# Patient Record
Sex: FEMALE | Race: BLACK OR AFRICAN AMERICAN | NOT HISPANIC OR LATINO | Employment: FULL TIME | URBAN - METROPOLITAN AREA
[De-identification: names, ages, dates, MRNs, and addresses within clinical notes are randomized per-mention and may not be internally consistent; named-entity substitution may affect disease eponyms.]

---

## 2023-04-12 ENCOUNTER — HOSPITAL ENCOUNTER (OUTPATIENT)
Dept: DATA CONVERSION | Facility: HOSPITAL | Age: 43
Discharge: HOME | End: 2023-04-13
Attending: INTERNAL MEDICINE | Admitting: INTERNAL MEDICINE

## 2023-04-12 LAB
ALBUMIN SERPL-MCNC: 4.8 GM/DL (ref 3.5–5)
ALBUMIN/GLOB SERPL: 1.7 RATIO (ref 1.5–3)
ALP BLD-CCNC: 85 U/L (ref 35–125)
ALT SERPL-CCNC: 16 U/L (ref 5–40)
AMPHETAMINES UR QL SCN>1000 NG/ML: NEGATIVE
ANION GAP SERPL CALCULATED.3IONS-SCNC: 21 MMOL/L (ref 0–19)
APAP SERPL-MCNC: 5 UG/ML (ref 15–30)
AST SERPL-CCNC: 31 U/L (ref 5–40)
BACTERIA UR QL AUTO: NEGATIVE
BARBITURATES UR QL SCN>300 NG/ML: NEGATIVE
BASOPHILS # BLD AUTO: 0.03 K/UL (ref 0–0.22)
BASOPHILS NFR BLD AUTO: 0.1 % (ref 0–1)
BENZODIAZ UR QL SCN>300 NG/ML: NEGATIVE
BILIRUB SERPL-MCNC: 0.9 MG/DL (ref 0.1–1.2)
BILIRUB UR QL STRIP.AUTO: NEGATIVE
BUN SERPL-MCNC: 12 MG/DL (ref 8–25)
BUN/CREAT SERPL: 17.1 RATIO (ref 8–21)
BZE UR QL SCN>300 NG/ML: NEGATIVE
CALCIUM SERPL-MCNC: 9.5 MG/DL (ref 8.5–10.4)
CANNABINOIDS UR QL SCN>50 NG/ML: NORMAL
CHLORIDE SERPL-SCNC: 99 MMOL/L (ref 97–107)
CLARITY UR: CLEAR
CO2 SERPL-SCNC: 16 MMOL/L (ref 24–31)
COLOR UR: ABNORMAL
CREAT SERPL-MCNC: 0.7 MG/DL (ref 0.4–1.6)
DEPRECATED RDW RBC AUTO: 41.8 FL (ref 37–54)
DIFFERENTIAL METHOD BLD: ABNORMAL
DRUG SCREEN COMMENT UR-IMP: NORMAL
EOSINOPHIL # BLD AUTO: 0 K/UL (ref 0–0.45)
EOSINOPHIL NFR BLD: 0 % (ref 0–3)
EPITH CASTS #/AREA UR COMP ASSIST: ABNORMAL /HPF
ERYTHROCYTE [DISTWIDTH] IN BLOOD BY AUTOMATED COUNT: 13.4 % (ref 11.7–15)
ETHANOL SERPL-MCNC: <0.01 GM/DL (ref 0–0.01)
EUA DISCLAIMER: NORMAL
FENTANYL+NORFENTANYL UR QL SCN: NORMAL
FLUAV RNA NPH QL NAA+PROBE: NEGATIVE
FLUBV RNA NPH QL NAA+PROBE: NEGATIVE
GFR SERPL CREATININE-BSD FRML MDRD: 111 ML/MIN/1.73 M2
GLOBULIN SER-MCNC: 2.8 G/DL (ref 1.9–3.7)
GLUCOSE SERPL-MCNC: 163 MG/DL (ref 65–99)
GLUCOSE UR STRIP.AUTO-MCNC: 300 MG/DL
HCG SERPL-ACNC: 1 MIU/ML
HCT VFR BLD AUTO: 36.4 % (ref 36–44)
HGB BLD-MCNC: 12.7 GM/DL (ref 12–15)
HGB UR QL STRIP.AUTO: 1 /HPF (ref 0–3)
HGB UR QL: NEGATIVE
HS TROPONIN T DELTA: 0 (ref 0–4)
HS TROPONIN T DELTA: NORMAL (ref 0–4)
HYALINE CASTS UR QL AUTO: 3 /LPF
IMM GRANULOCYTES # BLD AUTO: 0.12 K/UL (ref 0–0.1)
KETONES UR QL STRIP.AUTO: ABNORMAL
LEUKOCYTE ESTERASE UR QL STRIP.AUTO: NEGATIVE
LIPASE SERPL-CCNC: 16 U/L (ref 16–63)
LYMPHOCYTES # BLD AUTO: 0.73 K/UL (ref 1.2–3.2)
LYMPHOCYTES NFR BLD MANUAL: 3.3 % (ref 20–40)
MAGNESIUM SERPL-MCNC: 1.6 MG/DL (ref 1.6–3.1)
MCH RBC QN AUTO: 30 PG (ref 26–34)
MCHC RBC AUTO-ENTMCNC: 34.9 % (ref 31–37)
MCV RBC AUTO: 86.1 FL (ref 80–100)
METHADONE UR QL SCN>300 NG/ML: NEGATIVE
MONOCYTES # BLD AUTO: 1.08 K/UL (ref 0–0.8)
MONOCYTES NFR BLD MANUAL: 4.9 % (ref 0–8)
NEUTROPHILS # BLD AUTO: 20.03 K/UL
NEUTROPHILS # BLD AUTO: 20.03 K/UL (ref 1.8–7.7)
NEUTROPHILS.IMMATURE NFR BLD: 0.5 % (ref 0–1)
NEUTS SEG NFR BLD: 91.2 % (ref 50–70)
NITRITE UR QL STRIP.AUTO: NEGATIVE
NRBC BLD-RTO: 0 /100 WBC
OPIATES UR QL SCN>300 NG/ML: NEGATIVE
OXYCODONE UR QL: NEGATIVE
PCP UR QL SCN>25 NG/ML: NEGATIVE
PH UR STRIP.AUTO: 8 [PH] (ref 4.6–8)
PLATELET # BLD AUTO: 308 K/UL (ref 150–450)
PLATELET BLD QL SMEAR: ABNORMAL
PMV BLD AUTO: 11.1 CU (ref 7–12.6)
POTASSIUM SERPL-SCNC: 2.8 MMOL/L (ref 3.4–5.1)
PROT SERPL-MCNC: 7.6 G/DL (ref 5.9–7.9)
PROT UR STRIP.AUTO-MCNC: ABNORMAL MG/DL
RBC # BLD AUTO: 4.23 M/UL (ref 4–4.9)
RBC MORPH BLD: ABNORMAL
REFLEX MICROSCOPIC (UA): ABNORMAL
SALICYLATES SERPL-MCNC: 0.3 MG/DL (ref 3–25)
SARS-COV-2 RNA SPEC QL NAA+PROBE: NEGATIVE
SODIUM SERPL-SCNC: 136 MMOL/L (ref 133–145)
SP GR UR STRIP.AUTO: 1.02 (ref 1–1.03)
SQUAMOUS UR QL AUTO: ABNORMAL /HPF
TROPONIN T SERPL-MCNC: 6 NG/L
TROPONIN T SERPL-MCNC: 6 NG/L
UROBILINOGEN UR QL STRIP.AUTO: NORMAL MG/DL (ref 0–1)
WBC # BLD AUTO: 22 K/UL (ref 4.5–11)
WBC #/AREA URNS AUTO: 2 /HPF (ref 0–3)
WBC MORPH BLD: ABNORMAL

## 2023-04-13 LAB
ALBUMIN 45M PRE CHAL SERPL-MCNC: 23 MG/DL (ref 18–45)
ANION GAP SERPL CALCULATED.3IONS-SCNC: 13 MMOL/L (ref 0–19)
B-OH-BUTYR+ACETOACET BLD-SCNC: 1.2 MMOL/L (ref 0.1–0.3)
BUN SERPL-MCNC: 10 MG/DL (ref 8–25)
BUN/CREAT SERPL: 20 RATIO (ref 8–21)
CALCIUM SERPL-MCNC: 6.6 MG/DL (ref 8.5–10.4)
CHLORIDE SERPL-SCNC: 115 MMOL/L (ref 97–107)
CO2 SERPL-SCNC: 12 MMOL/L (ref 24–31)
CREAT SERPL-MCNC: 0.5 MG/DL (ref 0.4–1.6)
GFR SERPL CREATININE-BSD FRML MDRD: 121 ML/MIN/1.73 M2
GLUCOSE SERPL-MCNC: 95 MG/DL (ref 65–99)
POTASSIUM SERPL-SCNC: 2.5 MMOL/L (ref 3.4–5.1)
SODIUM SERPL-SCNC: 140 MMOL/L (ref 133–145)

## 2023-04-14 DIAGNOSIS — N83.209 CYST OF OVARY: ICD-10-CM

## 2023-04-14 DIAGNOSIS — D72.829 ELEVATED WHITE BLOOD CELL COUNT, UNSPECIFIED: ICD-10-CM

## 2023-04-14 DIAGNOSIS — F19.10 OTHER PSYCHOACTIVE SUBSTANCE ABUSE, UNCOMPLICATED (MULTI): ICD-10-CM

## 2023-04-14 DIAGNOSIS — E87.20 ACIDOSIS, UNSPECIFIED: ICD-10-CM

## 2023-04-14 DIAGNOSIS — R10.9 UNSPECIFIED ABDOMINAL PAIN: ICD-10-CM

## 2023-04-14 DIAGNOSIS — Z20.822 CONTACT WITH AND (SUSPECTED) EXPOSURE TO COVID-19: ICD-10-CM

## 2023-04-14 DIAGNOSIS — R07.9 CHEST PAIN, UNSPECIFIED: ICD-10-CM

## 2023-04-14 DIAGNOSIS — N83.2 CYST OF OVARY: ICD-10-CM

## 2023-04-14 DIAGNOSIS — E86.0 DEHYDRATION: ICD-10-CM

## 2023-04-14 DIAGNOSIS — E87.6 HYPOKALEMIA: ICD-10-CM

## 2023-04-14 DIAGNOSIS — K76.89 OTHER SPECIFIED DISEASES OF LIVER: ICD-10-CM

## 2023-04-14 DIAGNOSIS — F10.231 ALCOHOL DEPENDENCE WITH WITHDRAWAL DELIRIUM (MULTI): Primary | ICD-10-CM

## 2023-04-14 DIAGNOSIS — E83.42 HYPOMAGNESEMIA: ICD-10-CM

## 2023-04-14 DIAGNOSIS — F17.210 NICOTINE DEPENDENCE, CIGARETTES, UNCOMPLICATED: ICD-10-CM

## 2023-04-14 DIAGNOSIS — R11.2 NAUSEA WITH VOMITING, UNSPECIFIED: ICD-10-CM

## 2023-04-14 LAB
ALBUMIN SERPL-MCNC: 4 GM/DL (ref 3.5–5)
ALBUMIN/GLOB SERPL: 1.6 RATIO (ref 1.5–3)
ALP BLD-CCNC: 74 U/L (ref 35–125)
ALT SERPL-CCNC: 27 U/L (ref 5–40)
AMYLASE SERPL-CCNC: 90 U/L (ref 28–100)
ANION GAP SERPL CALCULATED.3IONS-SCNC: 10 MMOL/L (ref 0–19)
AST SERPL-CCNC: 38 U/L (ref 5–40)
BASOPHILS # BLD AUTO: 0.02 K/UL (ref 0–0.22)
BASOPHILS NFR BLD AUTO: 0.2 % (ref 0–1)
BILIRUB SERPL-MCNC: 0.4 MG/DL (ref 0.1–1.2)
BUN SERPL-MCNC: 7 MG/DL (ref 8–25)
BUN/CREAT SERPL: 8.8 RATIO (ref 8–21)
CALCIUM SERPL-MCNC: 8.4 MG/DL (ref 8.5–10.4)
CHLORIDE SERPL-SCNC: 104 MMOL/L (ref 97–107)
CO2 SERPL-SCNC: 25 MMOL/L (ref 24–31)
CREAT SERPL-MCNC: 0.8 MG/DL (ref 0.4–1.6)
DEPRECATED RDW RBC AUTO: 43.3 FL (ref 37–54)
DIFFERENTIAL METHOD BLD: ABNORMAL
EOSINOPHIL # BLD AUTO: 0.02 K/UL (ref 0–0.45)
EOSINOPHIL NFR BLD: 0.2 % (ref 0–3)
ERYTHROCYTE [DISTWIDTH] IN BLOOD BY AUTOMATED COUNT: 13.7 % (ref 11.7–15)
GFR SERPL CREATININE-BSD FRML MDRD: 95 ML/MIN/1.73 M2
GLOBULIN SER-MCNC: 2.5 G/DL (ref 1.9–3.7)
GLUCOSE SERPL-MCNC: 90 MG/DL (ref 65–99)
HCT VFR BLD AUTO: 35.8 % (ref 36–44)
HGB BLD-MCNC: 12.1 GM/DL (ref 12–15)
IMM GRANULOCYTES # BLD AUTO: 0.04 K/UL (ref 0–0.1)
LIPASE SERPL-CCNC: 17 U/L (ref 16–63)
LYMPHOCYTES # BLD AUTO: 2.34 K/UL (ref 1.2–3.2)
LYMPHOCYTES NFR BLD MANUAL: 22.3 % (ref 20–40)
MAGNESIUM SERPL-MCNC: 2.5 MG/DL (ref 1.6–3.1)
MCH RBC QN AUTO: 29.3 PG (ref 26–34)
MCHC RBC AUTO-ENTMCNC: 33.8 % (ref 31–37)
MCV RBC AUTO: 86.7 FL (ref 80–100)
MONOCYTES # BLD AUTO: 1.19 K/UL (ref 0–0.8)
MONOCYTES NFR BLD MANUAL: 11.3 % (ref 0–8)
NEUTROPHILS # BLD AUTO: 6.89 K/UL
NEUTROPHILS # BLD AUTO: 6.89 K/UL (ref 1.8–7.7)
NEUTROPHILS.IMMATURE NFR BLD: 0.4 % (ref 0–1)
NEUTS SEG NFR BLD: 65.6 % (ref 50–70)
NRBC BLD-RTO: 0 /100 WBC
PHOSPHATE SERPL-MCNC: 1.4 MG/DL (ref 2.5–4.5)
PLATELET # BLD AUTO: 300 K/UL (ref 150–450)
PMV BLD AUTO: 10.8 CU (ref 7–12.6)
POTASSIUM SERPL-SCNC: 3 MMOL/L (ref 3.4–5.1)
PROT SERPL-MCNC: 6.5 G/DL (ref 5.9–7.9)
RBC # BLD AUTO: 4.13 M/UL (ref 4–4.9)
SODIUM SERPL-SCNC: 139 MMOL/L (ref 133–145)
WBC # BLD AUTO: 10.5 K/UL (ref 4.5–11)

## 2023-07-25 ENCOUNTER — HOSPITAL ENCOUNTER (OUTPATIENT)
Dept: DATA CONVERSION | Facility: HOSPITAL | Age: 43
Discharge: HOME | End: 2023-07-25
Attending: STUDENT IN AN ORGANIZED HEALTH CARE EDUCATION/TRAINING PROGRAM

## 2023-07-25 DIAGNOSIS — R11.2 NAUSEA WITH VOMITING, UNSPECIFIED: ICD-10-CM

## 2023-07-25 DIAGNOSIS — F17.200 NICOTINE DEPENDENCE, UNSPECIFIED, UNCOMPLICATED: ICD-10-CM

## 2023-07-25 DIAGNOSIS — R10.30 LOWER ABDOMINAL PAIN, UNSPECIFIED: Primary | ICD-10-CM

## 2023-07-25 LAB
ACANTHOCYTES BLD QL SMEAR: ABNORMAL
ALBUMIN SERPL-MCNC: 5.2 GM/DL (ref 3.5–5)
ALBUMIN/GLOB SERPL: 1.7 RATIO (ref 1.5–3)
ALP BLD-CCNC: 93 U/L (ref 35–125)
ALT SERPL-CCNC: 18 U/L (ref 5–40)
AMPHETAMINES UR QL SCN>1000 NG/ML: NORMAL
ANION GAP SERPL CALCULATED.3IONS-SCNC: 25 MMOL/L (ref 0–19)
ANISOCYTOSIS BLD QL SMEAR: ABNORMAL
AST SERPL-CCNC: 31 U/L (ref 5–40)
BACTERIA UR QL AUTO: NEGATIVE
BARBITURATES UR QL SCN>300 NG/ML: NEGATIVE
BENZODIAZ UR QL SCN>300 NG/ML: NEGATIVE
BILIRUB SERPL-MCNC: 1.2 MG/DL (ref 0.1–1.2)
BILIRUB UR QL STRIP.AUTO: NEGATIVE
BUN SERPL-MCNC: 11 MG/DL (ref 8–25)
BUN/CREAT SERPL: 13.8 RATIO (ref 8–21)
BZE UR QL SCN>300 NG/ML: NEGATIVE
CALCIUM SERPL-MCNC: 10.4 MG/DL (ref 8.5–10.4)
CANNABINOIDS UR QL SCN>50 NG/ML: NORMAL
CHLORIDE SERPL-SCNC: 95 MMOL/L (ref 97–107)
CLARITY UR: CLEAR
CO2 SERPL-SCNC: 17 MMOL/L (ref 24–31)
COLOR UR: ABNORMAL
CREAT SERPL-MCNC: 0.8 MG/DL (ref 0.4–1.6)
DACRYOCYTES BLD QL SMEAR: ABNORMAL
DEPRECATED RDW RBC AUTO: 44.5 FL (ref 37–54)
DIFFERENTIAL METHOD BLD: MANUAL DIFF
DRUG SCREEN COMMENT UR-IMP: NORMAL
ERYTHROCYTE [DISTWIDTH] IN BLOOD BY AUTOMATED COUNT: 14.3 % (ref 11.7–15)
ETHANOL SERPL-MCNC: <0.01 GM/DL (ref 0–0.01)
FENTANYL+NORFENTANYL UR QL SCN: NORMAL
GFR SERPL CREATININE-BSD FRML MDRD: 95 ML/MIN/1.73 M2
GLOBULIN SER-MCNC: 3 G/DL (ref 1.9–3.7)
GLUCOSE SERPL-MCNC: 195 MG/DL (ref 65–99)
GLUCOSE UR STRIP.AUTO-MCNC: 30 MG/DL
HCG UR QL: NEGATIVE
HCT VFR BLD AUTO: 38.1 % (ref 36–44)
HGB BLD-MCNC: 12.7 GM/DL (ref 12–15)
HGB UR QL STRIP.AUTO: 118 /HPF (ref 0–3)
HGB UR QL: ABNORMAL
HYALINE CASTS UR QL AUTO: 6 /LPF
KETONES UR QL STRIP.AUTO: ABNORMAL
LEUKOCYTE ESTERASE UR QL STRIP.AUTO: NEGATIVE
LIPASE SERPL-CCNC: 16 U/L (ref 16–63)
LYMPHOCYTES # BLD AUTO: 4.09 K/UL (ref 1.2–3.2)
LYMPHOCYTES NFR BLD MANUAL: 17 % (ref 20–40)
MCH RBC QN AUTO: 28.3 PG (ref 26–34)
MCHC RBC AUTO-ENTMCNC: 33.3 % (ref 31–37)
MCV RBC AUTO: 84.9 FL (ref 80–100)
METHADONE UR QL SCN>300 NG/ML: NEGATIVE
MICROSCOPIC (UA): ABNORMAL
MONOCYTES # BLD AUTO: 0.24 K/UL (ref 0–0.8)
MONOCYTES NFR BLD MANUAL: 1 % (ref 0–8)
NEUTROPHILS # BLD AUTO: 19.7 K/UL (ref 1.8–7.7)
NEUTROPHILS # BLD AUTO: ABNORMAL K/UL
NEUTS SEG NFR BLD: 82 % (ref 50–70)
NITRITE UR QL STRIP.AUTO: NEGATIVE
OPIATES UR QL SCN>300 NG/ML: NEGATIVE
OVALOCYTES BLD QL SMEAR: ABNORMAL
OXYCODONE UR QL: NEGATIVE
PCP UR QL SCN>25 NG/ML: NEGATIVE
PH UR STRIP.AUTO: 5.5 [PH] (ref 4.6–8)
PLATELET # BLD AUTO: 303 K/UL (ref 150–450)
PLATELET BLD QL SMEAR: ABNORMAL
PMV BLD AUTO: 11.2 CU (ref 7–12.6)
POIKILOCYTOSIS BLD QL SMEAR: ABNORMAL
POTASSIUM SERPL-SCNC: 3 MMOL/L (ref 3.4–5.1)
PROT SERPL-MCNC: 8.2 G/DL (ref 5.9–7.9)
PROT UR STRIP.AUTO-MCNC: 100 MG/DL
RBC # BLD AUTO: 4.49 M/UL (ref 4–4.9)
SCHISTOCYTES BLD QL SMEAR: ABNORMAL
SODIUM SERPL-SCNC: 137 MMOL/L (ref 133–145)
SP GR UR STRIP.AUTO: 1.03 (ref 1–1.03)
SQUAMOUS UR QL AUTO: ABNORMAL /HPF
TARGETS BLD QL SMEAR: ABNORMAL
URINE CULTURE: ABNORMAL
UROBILINOGEN UR QL STRIP.AUTO: NORMAL MG/DL (ref 0–1)
WBC # BLD AUTO: 24 K/UL (ref 4.5–11)
WBC #/AREA URNS AUTO: 6 /HPF (ref 0–3)

## 2023-07-26 ENCOUNTER — HOSPITAL ENCOUNTER (OUTPATIENT)
Dept: DATA CONVERSION | Facility: HOSPITAL | Age: 43
Discharge: HOME | End: 2023-07-26
Attending: STUDENT IN AN ORGANIZED HEALTH CARE EDUCATION/TRAINING PROGRAM

## 2023-07-26 DIAGNOSIS — D25.9 LEIOMYOMA OF UTERUS, UNSPECIFIED: ICD-10-CM

## 2023-07-26 DIAGNOSIS — R10.84 GENERALIZED ABDOMINAL PAIN: Primary | ICD-10-CM

## 2023-07-26 DIAGNOSIS — R11.2 NAUSEA WITH VOMITING, UNSPECIFIED: ICD-10-CM

## 2023-07-26 LAB
ALBUMIN SERPL-MCNC: 4.3 GM/DL (ref 3.5–5)
ALBUMIN/GLOB SERPL: 1.5 RATIO (ref 1.5–3)
ALP BLD-CCNC: 79 U/L (ref 35–125)
ALT SERPL-CCNC: 21 U/L (ref 5–40)
ANION GAP SERPL CALCULATED.3IONS-SCNC: 18 MMOL/L (ref 0–19)
ANISOCYTOSIS BLD QL SMEAR: ABNORMAL
AST SERPL-CCNC: 34 U/L (ref 5–40)
BACTERIA UR QL AUTO: NEGATIVE
BASOPHILS # BLD AUTO: 0.17 K/UL (ref 0–0.22)
BASOPHILS NFR BLD AUTO: 1 % (ref 0–1)
BILIRUB SERPL-MCNC: 1 MG/DL (ref 0.1–1.2)
BILIRUB UR QL STRIP.AUTO: NEGATIVE
BUN SERPL-MCNC: 10 MG/DL (ref 8–25)
BUN/CREAT SERPL: 12.5 RATIO (ref 8–21)
CALCIUM SERPL-MCNC: 8.8 MG/DL (ref 8.5–10.4)
CHLORIDE SERPL-SCNC: 99 MMOL/L (ref 97–107)
CLARITY UR: CLEAR
CO2 SERPL-SCNC: 19 MMOL/L (ref 24–31)
COLOR UR: YELLOW
CREAT SERPL-MCNC: 0.8 MG/DL (ref 0.4–1.6)
DACRYOCYTES BLD QL SMEAR: ABNORMAL
DEPRECATED RDW RBC AUTO: 43.7 FL (ref 37–54)
DIFFERENTIAL METHOD BLD: MANUAL DIFF
ERYTHROCYTE [DISTWIDTH] IN BLOOD BY AUTOMATED COUNT: 14.6 % (ref 11.7–15)
GFR SERPL CREATININE-BSD FRML MDRD: 95 ML/MIN/1.73 M2
GLOBULIN SER-MCNC: 2.9 G/DL (ref 1.9–3.7)
GLUCOSE SERPL-MCNC: 121 MG/DL (ref 65–99)
GLUCOSE UR STRIP.AUTO-MCNC: 50 MG/DL
HCT VFR BLD AUTO: 36.2 % (ref 36–44)
HGB BLD-MCNC: 12.5 GM/DL (ref 12–15)
HGB UR QL STRIP.AUTO: 95 /HPF (ref 0–3)
HGB UR QL: ABNORMAL
HYALINE CASTS UR QL AUTO: 6 /LPF
KETONES UR QL STRIP.AUTO: ABNORMAL
LEUKOCYTE ESTERASE UR QL STRIP.AUTO: NEGATIVE
LIPASE SERPL-CCNC: 16 U/L (ref 16–63)
LYMPHOCYTES # BLD AUTO: 1.72 K/UL (ref 1.2–3.2)
LYMPHOCYTES NFR BLD MANUAL: 10 % (ref 20–40)
MCH RBC QN AUTO: 28.3 PG (ref 26–34)
MCHC RBC AUTO-ENTMCNC: 34.5 % (ref 31–37)
MCV RBC AUTO: 81.9 FL (ref 80–100)
MICROSCOPIC (UA): ABNORMAL
MONOCYTES # BLD AUTO: 1.38 K/UL (ref 0–0.8)
MONOCYTES NFR BLD MANUAL: 8 % (ref 0–8)
NEUTROPHILS # BLD AUTO: 13.96 K/UL (ref 1.8–7.7)
NEUTROPHILS # BLD AUTO: ABNORMAL K/UL
NEUTS SEG NFR BLD: 81 % (ref 50–70)
NITRITE UR QL STRIP.AUTO: NEGATIVE
OVALOCYTES BLD QL SMEAR: ABNORMAL
PH UR STRIP.AUTO: 6.5 [PH] (ref 4.6–8)
PLATELET # BLD AUTO: 308 K/UL (ref 150–450)
PLATELET BLD QL SMEAR: ABNORMAL
PMV BLD AUTO: 10.6 CU (ref 7–12.6)
POIKILOCYTOSIS BLD QL SMEAR: ABNORMAL
POTASSIUM SERPL-SCNC: 3.1 MMOL/L (ref 3.4–5.1)
PROT SERPL-MCNC: 7.2 G/DL (ref 5.9–7.9)
PROT UR STRIP.AUTO-MCNC: 30 MG/DL
RBC # BLD AUTO: 4.42 M/UL (ref 4–4.9)
SCHISTOCYTES BLD QL SMEAR: ABNORMAL
SODIUM SERPL-SCNC: 136 MMOL/L (ref 133–145)
SP GR UR STRIP.AUTO: 1.02 (ref 1–1.03)
SQUAMOUS UR QL AUTO: ABNORMAL /HPF
URINE CULTURE: ABNORMAL
UROBILINOGEN UR QL STRIP.AUTO: NORMAL MG/DL (ref 0–1)
WBC # BLD AUTO: 17.2 K/UL (ref 4.5–11)
WBC #/AREA URNS AUTO: 2 /HPF (ref 0–3)
WBC TOXIC VACUOLES BLD QL SMEAR: ABNORMAL

## 2023-09-25 PROBLEM — F10.20 ALCOHOL DEPENDENCE (MULTI): Status: ACTIVE | Noted: 2023-09-25

## 2023-09-25 PROBLEM — F10.931 ALCOHOL WITHDRAWAL DELIRIUM (MULTI): Status: ACTIVE | Noted: 2023-09-25

## 2023-09-25 PROBLEM — F41.9 ANXIETY: Status: ACTIVE | Noted: 2023-09-25

## 2023-09-25 PROBLEM — D25.9 UTERINE LEIOMYOMA: Status: ACTIVE | Noted: 2023-09-25

## 2023-09-25 RX ORDER — HYDROXYZINE HYDROCHLORIDE 25 MG/1
1 TABLET, FILM COATED ORAL 2 TIMES DAILY PRN
COMMUNITY

## 2023-10-18 ENCOUNTER — APPOINTMENT (OUTPATIENT)
Dept: PRIMARY CARE | Facility: CLINIC | Age: 43
End: 2023-10-18
Payer: MEDICAID

## 2024-01-08 ENCOUNTER — APPOINTMENT (OUTPATIENT)
Dept: RADIOLOGY | Facility: HOSPITAL | Age: 44
End: 2024-01-08
Payer: MEDICAID

## 2024-01-08 ENCOUNTER — HOSPITAL ENCOUNTER (EMERGENCY)
Facility: HOSPITAL | Age: 44
Discharge: HOME | End: 2024-01-08
Attending: EMERGENCY MEDICINE
Payer: MEDICAID

## 2024-01-08 VITALS
RESPIRATION RATE: 18 BRPM | SYSTOLIC BLOOD PRESSURE: 112 MMHG | TEMPERATURE: 98.2 F | HEART RATE: 81 BPM | BODY MASS INDEX: 21.85 KG/M2 | WEIGHT: 115.74 LBS | HEIGHT: 61 IN | DIASTOLIC BLOOD PRESSURE: 85 MMHG | OXYGEN SATURATION: 100 %

## 2024-01-08 DIAGNOSIS — N83.209 CYST OF OVARY, UNSPECIFIED LATERALITY: ICD-10-CM

## 2024-01-08 DIAGNOSIS — D25.9 UTERINE LEIOMYOMA, UNSPECIFIED LOCATION: ICD-10-CM

## 2024-01-08 DIAGNOSIS — R10.9 ABDOMINAL PAIN, UNSPECIFIED ABDOMINAL LOCATION: Primary | ICD-10-CM

## 2024-01-08 LAB
ALBUMIN SERPL-MCNC: 3.8 G/DL (ref 3.5–5)
ALP BLD-CCNC: 65 U/L (ref 35–125)
ALT SERPL-CCNC: 7 U/L (ref 5–40)
ANION GAP SERPL CALC-SCNC: 11 MMOL/L
APPEARANCE UR: ABNORMAL
AST SERPL-CCNC: 15 U/L (ref 5–40)
BASOPHILS # BLD MANUAL: 0.21 X10*3/UL (ref 0–0.1)
BASOPHILS NFR BLD MANUAL: 3 %
BILIRUB SERPL-MCNC: 0.4 MG/DL (ref 0.1–1.2)
BILIRUB UR STRIP.AUTO-MCNC: NEGATIVE MG/DL
BUN SERPL-MCNC: 5 MG/DL (ref 8–25)
CALCIUM SERPL-MCNC: 9 MG/DL (ref 8.5–10.4)
CHLORIDE SERPL-SCNC: 107 MMOL/L (ref 97–107)
CO2 SERPL-SCNC: 23 MMOL/L (ref 24–31)
COLOR UR: ABNORMAL
CREAT SERPL-MCNC: 0.8 MG/DL (ref 0.4–1.6)
EGFRCR SERPLBLD CKD-EPI 2021: >90 ML/MIN/1.73M*2
EOSINOPHIL # BLD MANUAL: 0 X10*3/UL (ref 0–0.7)
EOSINOPHIL NFR BLD MANUAL: 0 %
ERYTHROCYTE [DISTWIDTH] IN BLOOD BY AUTOMATED COUNT: 14.9 % (ref 11.5–14.5)
GLUCOSE SERPL-MCNC: 90 MG/DL (ref 65–99)
GLUCOSE UR STRIP.AUTO-MCNC: NORMAL MG/DL
HCG UR QL IA.RAPID: NEGATIVE
HCT VFR BLD AUTO: 35.8 % (ref 36–46)
HGB BLD-MCNC: 11.5 G/DL (ref 12–16)
IMM GRANULOCYTES # BLD AUTO: 0.01 X10*3/UL (ref 0–0.7)
IMM GRANULOCYTES NFR BLD AUTO: 0.1 % (ref 0–0.9)
KETONES UR STRIP.AUTO-MCNC: NEGATIVE MG/DL
LEUKOCYTE ESTERASE UR QL STRIP.AUTO: NEGATIVE
LIPASE SERPL-CCNC: <16 U/L (ref 16–63)
LYMPHOCYTES # BLD MANUAL: 3.55 X10*3/UL (ref 1.2–4.8)
LYMPHOCYTES NFR BLD MANUAL: 50 %
MCH RBC QN AUTO: 27.2 PG (ref 26–34)
MCHC RBC AUTO-ENTMCNC: 32.1 G/DL (ref 32–36)
MCV RBC AUTO: 85 FL (ref 80–100)
MONOCYTES # BLD MANUAL: 0.21 X10*3/UL (ref 0.1–1)
MONOCYTES NFR BLD MANUAL: 3 %
MUCOUS THREADS #/AREA URNS AUTO: ABNORMAL /LPF
NEUTS SEG # BLD MANUAL: 3.05 X10*3/UL (ref 1.2–7)
NEUTS SEG NFR BLD MANUAL: 43 %
NITRITE UR QL STRIP.AUTO: NEGATIVE
NRBC BLD-RTO: 0 /100 WBCS (ref 0–0)
PH UR STRIP.AUTO: 5.5 [PH]
PLATELET # BLD AUTO: 364 X10*3/UL (ref 150–450)
POTASSIUM SERPL-SCNC: 3.4 MMOL/L (ref 3.4–5.1)
PROT SERPL-MCNC: 6.4 G/DL (ref 5.9–7.9)
PROT UR STRIP.AUTO-MCNC: ABNORMAL MG/DL
RBC # BLD AUTO: 4.23 X10*6/UL (ref 4–5.2)
RBC # UR STRIP.AUTO: ABNORMAL /UL
RBC #/AREA URNS AUTO: >20 /HPF
RBC MORPH BLD: ABNORMAL
SODIUM SERPL-SCNC: 141 MMOL/L (ref 133–145)
SP GR UR STRIP.AUTO: 1.02
SQUAMOUS #/AREA URNS AUTO: ABNORMAL /HPF
TOTAL CELLS COUNTED BLD: 100
TROPONIN T SERPL-MCNC: <6 NG/L
UROBILINOGEN UR STRIP.AUTO-MCNC: NORMAL MG/DL
VARIANT LYMPHS # BLD MANUAL: 0.07 X10*3/UL (ref 0–0.5)
VARIANT LYMPHS NFR BLD: 1 %
WBC # BLD AUTO: 7.1 X10*3/UL (ref 4.4–11.3)
WBC #/AREA URNS AUTO: ABNORMAL /HPF

## 2024-01-08 PROCEDURE — 74177 CT ABD & PELVIS W/CONTRAST: CPT

## 2024-01-08 PROCEDURE — 81001 URINALYSIS AUTO W/SCOPE: CPT | Performed by: EMERGENCY MEDICINE

## 2024-01-08 PROCEDURE — 96374 THER/PROPH/DIAG INJ IV PUSH: CPT

## 2024-01-08 PROCEDURE — 84484 ASSAY OF TROPONIN QUANT: CPT | Performed by: EMERGENCY MEDICINE

## 2024-01-08 PROCEDURE — 85027 COMPLETE CBC AUTOMATED: CPT | Performed by: EMERGENCY MEDICINE

## 2024-01-08 PROCEDURE — 85007 BL SMEAR W/DIFF WBC COUNT: CPT | Performed by: EMERGENCY MEDICINE

## 2024-01-08 PROCEDURE — 99285 EMERGENCY DEPT VISIT HI MDM: CPT | Performed by: EMERGENCY MEDICINE

## 2024-01-08 PROCEDURE — 96375 TX/PRO/DX INJ NEW DRUG ADDON: CPT

## 2024-01-08 PROCEDURE — 81025 URINE PREGNANCY TEST: CPT | Performed by: EMERGENCY MEDICINE

## 2024-01-08 PROCEDURE — 83690 ASSAY OF LIPASE: CPT | Performed by: EMERGENCY MEDICINE

## 2024-01-08 PROCEDURE — 36415 COLL VENOUS BLD VENIPUNCTURE: CPT | Performed by: EMERGENCY MEDICINE

## 2024-01-08 PROCEDURE — 2550000001 HC RX 255 CONTRASTS: Performed by: EMERGENCY MEDICINE

## 2024-01-08 PROCEDURE — 76856 US EXAM PELVIC COMPLETE: CPT

## 2024-01-08 PROCEDURE — 96361 HYDRATE IV INFUSION ADD-ON: CPT

## 2024-01-08 PROCEDURE — 2500000004 HC RX 250 GENERAL PHARMACY W/ HCPCS (ALT 636 FOR OP/ED): Performed by: EMERGENCY MEDICINE

## 2024-01-08 PROCEDURE — 80053 COMPREHEN METABOLIC PANEL: CPT | Performed by: EMERGENCY MEDICINE

## 2024-01-08 RX ORDER — NAPROXEN 500 MG/1
500 TABLET ORAL
Qty: 30 TABLET | Refills: 0 | Status: SHIPPED | OUTPATIENT
Start: 2024-01-08 | End: 2024-01-23

## 2024-01-08 RX ORDER — FAMOTIDINE 10 MG/ML
20 INJECTION INTRAVENOUS ONCE
Status: COMPLETED | OUTPATIENT
Start: 2024-01-08 | End: 2024-01-08

## 2024-01-08 RX ORDER — KETOROLAC TROMETHAMINE 30 MG/ML
15 INJECTION, SOLUTION INTRAMUSCULAR; INTRAVENOUS ONCE
Status: COMPLETED | OUTPATIENT
Start: 2024-01-08 | End: 2024-01-08

## 2024-01-08 RX ORDER — ACETAMINOPHEN 325 MG/1
650 TABLET ORAL ONCE
Status: COMPLETED | OUTPATIENT
Start: 2024-01-08 | End: 2024-01-08

## 2024-01-08 RX ORDER — ONDANSETRON HYDROCHLORIDE 2 MG/ML
4 INJECTION, SOLUTION INTRAVENOUS ONCE
Status: COMPLETED | OUTPATIENT
Start: 2024-01-08 | End: 2024-01-08

## 2024-01-08 RX ORDER — ONDANSETRON 4 MG/1
4 TABLET, FILM COATED ORAL EVERY 6 HOURS
Qty: 12 TABLET | Refills: 0 | Status: SHIPPED | OUTPATIENT
Start: 2024-01-08 | End: 2024-01-11

## 2024-01-08 RX ADMIN — FAMOTIDINE 20 MG: 10 INJECTION INTRAVENOUS at 11:51

## 2024-01-08 RX ADMIN — ONDANSETRON 4 MG: 2 INJECTION INTRAMUSCULAR; INTRAVENOUS at 11:52

## 2024-01-08 RX ADMIN — ACETAMINOPHEN 650 MG: 325 TABLET ORAL at 11:51

## 2024-01-08 RX ADMIN — KETOROLAC TROMETHAMINE 15 MG: 30 INJECTION INTRAMUSCULAR; INTRAVENOUS at 11:52

## 2024-01-08 RX ADMIN — IOHEXOL 75 ML: 350 INJECTION, SOLUTION INTRAVENOUS at 13:11

## 2024-01-08 RX ADMIN — SODIUM CHLORIDE 1000 ML: 900 INJECTION, SOLUTION INTRAVENOUS at 11:53

## 2024-01-08 ASSESSMENT — PAIN DESCRIPTION - FREQUENCY: FREQUENCY: CONSTANT/CONTINUOUS

## 2024-01-08 ASSESSMENT — PAIN DESCRIPTION - PAIN TYPE: TYPE: ACUTE PAIN

## 2024-01-08 ASSESSMENT — PAIN - FUNCTIONAL ASSESSMENT: PAIN_FUNCTIONAL_ASSESSMENT: 0-10

## 2024-01-08 ASSESSMENT — COLUMBIA-SUICIDE SEVERITY RATING SCALE - C-SSRS
1. IN THE PAST MONTH, HAVE YOU WISHED YOU WERE DEAD OR WISHED YOU COULD GO TO SLEEP AND NOT WAKE UP?: NO
6. HAVE YOU EVER DONE ANYTHING, STARTED TO DO ANYTHING, OR PREPARED TO DO ANYTHING TO END YOUR LIFE?: NO
2. HAVE YOU ACTUALLY HAD ANY THOUGHTS OF KILLING YOURSELF?: NO

## 2024-01-08 ASSESSMENT — PAIN SCALES - GENERAL: PAINLEVEL_OUTOF10: 10 - WORST POSSIBLE PAIN

## 2024-01-08 ASSESSMENT — PAIN DESCRIPTION - DESCRIPTORS
DESCRIPTORS: STABBING
DESCRIPTORS: STABBING

## 2024-01-08 ASSESSMENT — PAIN DESCRIPTION - LOCATION: LOCATION: ABDOMEN

## 2024-01-08 ASSESSMENT — PAIN DESCRIPTION - PROGRESSION: CLINICAL_PROGRESSION: NOT CHANGED

## 2024-01-08 ASSESSMENT — PAIN DESCRIPTION - ORIENTATION: ORIENTATION: LOWER

## 2024-01-08 ASSESSMENT — PAIN DESCRIPTION - ONSET: ONSET: SUDDEN

## 2024-01-08 NOTE — ED PROVIDER NOTES
HPI   Chief Complaint   Patient presents with    Abdominal Pain     I have lower abd pain for the past 3 days the stabbing pain is probably from endometriosis I have been vomitng       43-year-old female presented emergency department with a chief complaint of lower abdominal pain.  States she has a history of endometriosis and uterine fibroid.  Has been seen for similar symptoms over the last several months through different emergency departments.  She has not followed up with her OB/GYN.  She called off work to come to the emergency department today.  She denies vaginal bleeding or discharge.  She denies lightheadedness dizziness numbness weakness.  Denies chest pain or shortness of breath.  Has not really tried anything for relief.  No other complaint.                          [place] Coma Scale Score: 15                  Patient History   No past medical history on file.  No past surgical history on file.  Family History   Problem Relation Name Age of Onset    No Known Problems Sister      No Known Problems Sister      No Known Problems Sister      No Known Problems Brother      No Known Problems Brother      No Known Problems Brother       Social History     Tobacco Use    Smoking status: Not on file    Smokeless tobacco: Not on file   Substance Use Topics    Alcohol use: Not on file    Drug use: Not on file       Physical Exam   ED Triage Vitals [01/08/24 1128]   Temp Heart Rate Resp BP   36.8 °C (98.2 °F) 81 18 112/85      SpO2 Temp Source Heart Rate Source Patient Position   100 % Oral Monitor Sitting      BP Location FiO2 (%)     Right arm --       Physical Exam  Vitals and nursing note reviewed.   Constitutional:       Appearance: She is well-developed.   HENT:      Head: Normocephalic and atraumatic.   Cardiovascular:      Rate and Rhythm: Normal rate and regular rhythm.   Pulmonary:      Effort: Pulmonary effort is normal.      Breath sounds: Normal breath sounds.   Abdominal:      General: Abdomen is  flat and protuberant. Bowel sounds are normal.      Palpations: Abdomen is soft.   Neurological:      Mental Status: She is alert.         ED Course & MDM   Diagnoses as of 01/08/24 1433   Abdominal pain, unspecified abdominal location   Cyst of ovary, unspecified laterality   Uterine leiomyoma, unspecified location       Medical Decision Making  I have seen and evaluated this patient.  The attending physician has also seen and evaluated this patient.  Vital signs, laboratory testing and diagnostic images if applicable have been reviewed.  All laboratory and imaging is interpreted by myself unless otherwise stated.  Radiology studies are also formally interpreted by radiologist.    CBC without significant leukocytosis or anemia, metabolic panel without significant renal impairment or electrolyte abnormality.  CT indicative of ovarian fibroid and cyst.  Ultrasound can indicative of fibroid and cyst no evidence of torsion.  No intra-abdominal surgical emergent condition identified.  No bacterial infection identified.  Patient discharged with pain control, OB/GYN follow-up for definitive management of her chronic issues.    Labs Reviewed  URINALYSIS WITH REFLEX MICROSCOPIC - Abnormal     Color, Urine                    (*)                  Appearance, Urine             Ex.Turbid (*)               Specific Gravity, Urine       1.017                  pH, Urine                     5.5                    Protein, Urine                50 (1+) (*)               Glucose, Urine                Normal                 Blood, Urine                  OVER (3+) (*)               Ketones, Urine                NEGATIVE                Bilirubin, Urine              NEGATIVE                Urobilinogen, Urine           Normal                 Nitrite, Urine                NEGATIVE                Leukocyte Esterase, Urine     NEGATIVE             CBC WITH AUTO DIFFERENTIAL - Abnormal     WBC                           7.1                     nRBC                          0.0                    RBC                           4.23                   Hemoglobin                    11.5 (*)               Hematocrit                    35.8 (*)               MCV                           85                     MCH                           27.2                   MCHC                          32.1                   RDW                           14.9 (*)               Platelets                     364                    Immature Granulocytes %, Automated   0.1                    Immature Granulocytes Absolute, Au*   0.01                       Narrative: The previously reported component Neutrophils % is no longer being reported.  The previously reported component Lymphocytes % is no longer being reported.  The previously reported component Monocytes % is no longer being reported.  The previously                   reported component Eosinophils % is no longer being reported.  The previously reported component Basophils % is no longer being reported.  The previously reported component Absolute Neutrophils is no longer being reported.  The previously reported                   component Absolute Lymphocytes is no longer being reported.  The previously reported component Absolute Monocytes is no longer being reported.  The previously reported component Absolute Eosinophils is no longer being reported.  The previously reported                   component Absolute Basophils is no longer being reported.  COMPREHENSIVE METABOLIC PANEL - Abnormal     Glucose                       90                     Sodium                        141                    Potassium                     3.4                    Chloride                      107                    Bicarbonate                   23 (*)                 Urea Nitrogen                 5 (*)                  Creatinine                    0.80                   eGFR                          >90                     Calcium                       9.0                    Albumin                       3.8                    Alkaline Phosphatase          65                     Total Protein                 6.4                    AST                           15                     Bilirubin, Total              0.4                    ALT                           7                      Anion Gap                     11                  LIPASE - Abnormal     Lipase                        <16 (*)             MICROSCOPIC ONLY, URINE - Abnormal     WBC, Urine                    6-10 (*)               RBC, Urine                    >20 (*)                Squamous Epithelial Cells, Urine                          Mucus, Urine                  1+                  MANUAL DIFFERENTIAL - Abnormal     Neutrophils %, Manual         43.0                   Lymphocytes %, Manual         50.0                   Monocytes %, Manual           3.0                    Eosinophils %, Manual         0.0                    Basophils %, Manual           3.0                    Atypical Lymphocytes %, Manual   1.0                    Seg Neutrophils Absolute, Manual   3.05                   Lymphocytes Absolute, Manual   3.55                   Monocytes Absolute, Manual    0.21                   Eosinophils Absolute, Manual   0.00                   Basophils Absolute, Manual    0.21 (*)               Atypical Lymphs Absolute, Manual   0.07                   Total Cells Counted           100                    RBC Morphology                                    HCG, URINE, QUALITATIVE - Normal     HCG, Urine                    NEGATIVE             SERIAL TROPONIN, INITIAL ([facility]) - Normal     Troponin T, High Sensitivity   <6                  TROPONIN T SERIES, HIGH SENSITIVITY (0, 2 HR, 6 HR)         Narrative: The following orders were created for panel order Troponin T Series, High Sensitivity (0, 2HR, 6HR).                  Procedure                                Abnormality         Status                                     ---------                               -----------         ------                                     Serial Troponin, Initial...[[id number]]  Normal              Final result                               Serial Troponin, 2 Hour ...[[id number]]                                                                                   Please view results for these tests on the individual orders.  SERIAL TROPONIN,  2 HOUR ([facility])  CT abdomen pelvis w IV contrast   Final Result    5.5 cm cystic structure in left adnexa, developed since CT    07/25/2023. This may represent ovarian cysts or hydrosalpinx. Small    amount of free fluid in the pelvis, within physiologic limits.    Fibroid uterus.          Left adrenal nodule, stable since April 2023.          MACRO:    None          Signed by: Brad Gary 1/8/2024 1:32 PM    Dictation workstation:   [id number]     US pelvis   Final Result    There is a small amount of free fluid seen throughout the pelvis.          Left ovarian cyst measuring 3.4 x 2.6 x 1.9 cm.          Normal flow to the ovaries bilaterally.          Multiple heterogeneous regions in the uterus most consistent with    fibroids as described in the body of the report.          MACRO:    None.          Signed by: Negrito Calhoun 1/8/2024 1:45 PM    Dictation workstation:   [id number]              Your medication list        START taking these medications        Instructions Last Dose Given Next Dose Due   naproxen 500 mg tablet  Commonly known as: Naprosyn      Take 1 tablet (500 mg) by mouth 2 times a day with meals for 15 days.       ondansetron 4 mg tablet  Commonly known as: Zofran      Take 1 tablet (4 mg) by mouth every 6 hours for 3 days.              ASK your doctor about these medications        Instructions Last Dose Given Next Dose Due   hydrOXYzine HCL 25 mg tablet  Commonly known as: Atarax                     Where to Get Your  Medications        These medications were sent to [facility]/pharmacy #[id number] - [place], [place] - [address] [address]  [address] [place] [address]      Hours: 24-hours Phone: [phone]   naproxen 500 mg tablet  ondansetron 4 mg tablet           Procedure  Procedures     Samson Marcum PA-C  01/08/24 8958     Rotation Flap Text: The defect edges were debeveled with a #15 scalpel blade.  Given the location of the defect, shape of the defect and the proximity to free margins a rotation flap was deemed most appropriate.  Using a sterile surgical marker, an appropriate rotation flap was drawn incorporating the defect and placing the expected incisions within the relaxed skin tension lines where possible.    The area thus outlined was incised deep to adipose tissue with a #15 scalpel blade.  The skin margins were undermined to an appropriate distance in all directions utilizing iris scissors.

## 2024-01-08 NOTE — PROGRESS NOTES
Attestation note/supervisory note for MITCH Marcum      The patient is a 43-year-old female presenting to the emergency department for evaluation of diffuse abdominal pain that is worse in the lower abdomen.  She also reports nausea without vomiting.  She states that she did not feel well enough to go to work today so she came to the emergency room.  She does have a history of uterine fibroids and ovarian cyst.  She states that the pain is a constant aching pain.  It has been worsening over the past 2 days.  No fever or chills.  No headache or visual changes.  No chest pain or shortness of breath.  No back pain.  No urinary complaints.  No vaginal discharge.  No diarrhea or constipation.  She is nauseated but has not vomited.  No sick contacts or recent travel.  All pertinent positives and negatives are recorded above.  All other systems reviewed and otherwise negative.  Vital signs within normal limits.  Physical exam with a well-nourished well-developed female in no acute distress.  HEENT exam within normal limits.  She has no evidence of airway compromise or respiratory distress.  Abdominal exam with diffuse tenderness to palpation.  No rebound or guarding.  No palpable masses.  No flank pain with percussion or palpation.  She has no gross motor, neurologic or vascular deficits on exam.      IV Pepcid, IV Zofran, IV fluids, IV Toradol and oral acetaminophen ordered      Diagnostic labs with mild anemia and ketonuria but otherwise unremarkable.      CT abdomen pelvis w IV contrast   Final Result   5.5 cm cystic structure in left adnexa, developed since CT   07/25/2023. This may represent ovarian cysts or hydrosalpinx. Small   amount of free fluid in the pelvis, within physiologic limits.   Fibroid uterus.        Left adrenal nodule, stable since April 2023.        MACRO:   None        Signed by: Brad Gary 1/8/2024 1:32 PM   Dictation workstation:   [id number]      US pelvis   Final Result   There is a small  amount of free fluid seen throughout the pelvis.        Left ovarian cyst measuring 3.4 x 2.6 x 1.9 cm.        Normal flow to the ovaries bilaterally.        Multiple heterogeneous regions in the uterus most consistent with   fibroids as described in the body of the report.        MACRO:   None.        Signed by: Negrito Calhoun 1/8/2024 1:45 PM   Dictation workstation:   [id number]           The patient does have evidence of a left ovarian cyst and CT imaging and ultrasound but no evidence of torsion.  She also has a fibroid uterus.  She does not have any other acute process such as ureterolithiasis appendicitis and/or diverticulitis.      Patient was released in good condition.  She was instructed follow-up with her primary care physician within 1 to 2 days for further management of her current symptoms.  She was also given a referral to OB/GYN.  She will return to the emergency department sooner with worsening of symptoms or onset of new symptoms.  Rx given for naproxen and Zofran.      Impression/diagnosis  1.  Abdominal pain  2. Nausea without vomiting  3.  Left ovarian cyst  4 fibroid uterus.       I personally saw the patient and performed a substantive portion of the visit including all aspects of the medical decision making.      I reviewed the results of the diagnostic labs and diagnostic imaging.  Formal radiology reading was completed by the radiologist      Maria Guadalupe Adamson MD

## 2024-01-08 NOTE — Clinical Note
Radha Huggins was seen and treated in our emergency department on 1/8/2024.  She may return to work on 01/09/2024.       If you have any questions or concerns, please don't hesitate to call.      Maria Guadalupe Adamson MD

## 2024-12-09 ENCOUNTER — APPOINTMENT (OUTPATIENT)
Dept: RADIOLOGY | Facility: HOSPITAL | Age: 44
End: 2024-12-09
Payer: MEDICAID

## 2024-12-09 ENCOUNTER — HOSPITAL ENCOUNTER (EMERGENCY)
Facility: HOSPITAL | Age: 44
Discharge: HOME | End: 2024-12-09
Attending: EMERGENCY MEDICINE
Payer: MEDICAID

## 2024-12-09 VITALS
BODY MASS INDEX: 21.71 KG/M2 | HEART RATE: 86 BPM | SYSTOLIC BLOOD PRESSURE: 137 MMHG | TEMPERATURE: 97.9 F | RESPIRATION RATE: 16 BRPM | HEIGHT: 61 IN | WEIGHT: 115 LBS | DIASTOLIC BLOOD PRESSURE: 93 MMHG | OXYGEN SATURATION: 98 %

## 2024-12-09 DIAGNOSIS — M54.50 ACUTE LOW BACK PAIN, UNSPECIFIED BACK PAIN LATERALITY, UNSPECIFIED WHETHER SCIATICA PRESENT: Primary | ICD-10-CM

## 2024-12-09 LAB
ALBUMIN SERPL BCP-MCNC: 4.2 G/DL (ref 3.4–5)
ALP SERPL-CCNC: 74 U/L (ref 33–110)
ALT SERPL W P-5'-P-CCNC: 9 U/L (ref 7–45)
ANION GAP SERPL CALCULATED.3IONS-SCNC: 12 MMOL/L (ref 10–20)
APPEARANCE UR: CLEAR
AST SERPL W P-5'-P-CCNC: 16 U/L (ref 9–39)
BACTERIA #/AREA URNS AUTO: ABNORMAL /HPF
BASOPHILS # BLD MANUAL: 0.18 X10*3/UL (ref 0–0.1)
BASOPHILS NFR BLD MANUAL: 2 %
BILIRUB SERPL-MCNC: 0.4 MG/DL (ref 0–1.2)
BILIRUB UR STRIP.AUTO-MCNC: NEGATIVE MG/DL
BUN SERPL-MCNC: 13 MG/DL (ref 6–23)
CALCIUM SERPL-MCNC: 8.5 MG/DL (ref 8.6–10.3)
CHLORIDE SERPL-SCNC: 107 MMOL/L (ref 98–107)
CO2 SERPL-SCNC: 23 MMOL/L (ref 21–32)
COLOR UR: COLORLESS
CREAT SERPL-MCNC: 0.7 MG/DL (ref 0.5–1.05)
EGFRCR SERPLBLD CKD-EPI 2021: >90 ML/MIN/1.73M*2
EOSINOPHIL # BLD MANUAL: 0.28 X10*3/UL (ref 0–0.7)
EOSINOPHIL NFR BLD MANUAL: 3 %
ERYTHROCYTE [DISTWIDTH] IN BLOOD BY AUTOMATED COUNT: 15.5 % (ref 11.5–14.5)
GLUCOSE SERPL-MCNC: 95 MG/DL (ref 74–99)
GLUCOSE UR STRIP.AUTO-MCNC: NORMAL MG/DL
HCG UR QL IA.RAPID: NEGATIVE
HCT VFR BLD AUTO: 39 % (ref 36–46)
HGB BLD-MCNC: 12.7 G/DL (ref 12–16)
HOLD SPECIMEN: NORMAL
IMM GRANULOCYTES # BLD AUTO: 0.02 X10*3/UL (ref 0–0.7)
IMM GRANULOCYTES NFR BLD AUTO: 0.2 % (ref 0–0.9)
KETONES UR STRIP.AUTO-MCNC: NEGATIVE MG/DL
LEUKOCYTE ESTERASE UR QL STRIP.AUTO: ABNORMAL
LIPASE SERPL-CCNC: 17 U/L (ref 9–82)
LYMPHOCYTES # BLD MANUAL: 4.23 X10*3/UL (ref 1.2–4.8)
LYMPHOCYTES NFR BLD MANUAL: 46 %
MCH RBC QN AUTO: 28.3 PG (ref 26–34)
MCHC RBC AUTO-ENTMCNC: 32.6 G/DL (ref 32–36)
MCV RBC AUTO: 87 FL (ref 80–100)
MONOCYTES # BLD MANUAL: 0.55 X10*3/UL (ref 0.1–1)
MONOCYTES NFR BLD MANUAL: 6 %
NEUTS SEG # BLD MANUAL: 3.96 X10*3/UL (ref 1.2–7)
NEUTS SEG NFR BLD MANUAL: 43 %
NITRITE UR QL STRIP.AUTO: NEGATIVE
NRBC BLD-RTO: 0 /100 WBCS (ref 0–0)
PH UR STRIP.AUTO: 5.5 [PH]
PLATELET # BLD AUTO: 356 X10*3/UL (ref 150–450)
POLYCHROMASIA BLD QL SMEAR: ABNORMAL
POTASSIUM SERPL-SCNC: 4 MMOL/L (ref 3.5–5.3)
PROT SERPL-MCNC: 7.1 G/DL (ref 6.4–8.2)
PROT UR STRIP.AUTO-MCNC: NEGATIVE MG/DL
RBC # BLD AUTO: 4.48 X10*6/UL (ref 4–5.2)
RBC # UR STRIP.AUTO: NEGATIVE /UL
RBC #/AREA URNS AUTO: ABNORMAL /HPF
RBC MORPH BLD: ABNORMAL
SODIUM SERPL-SCNC: 138 MMOL/L (ref 136–145)
SP GR UR STRIP.AUTO: 1.01
SQUAMOUS #/AREA URNS AUTO: ABNORMAL /HPF
TARGETS BLD QL SMEAR: ABNORMAL
TOTAL CELLS COUNTED BLD: 100
UROBILINOGEN UR STRIP.AUTO-MCNC: NORMAL MG/DL
WBC # BLD AUTO: 9.2 X10*3/UL (ref 4.4–11.3)
WBC #/AREA URNS AUTO: ABNORMAL /HPF

## 2024-12-09 PROCEDURE — 83690 ASSAY OF LIPASE: CPT | Performed by: NURSE PRACTITIONER

## 2024-12-09 PROCEDURE — 74177 CT ABD & PELVIS W/CONTRAST: CPT

## 2024-12-09 PROCEDURE — 81025 URINE PREGNANCY TEST: CPT | Performed by: NURSE PRACTITIONER

## 2024-12-09 PROCEDURE — 85027 COMPLETE CBC AUTOMATED: CPT | Performed by: NURSE PRACTITIONER

## 2024-12-09 PROCEDURE — 74177 CT ABD & PELVIS W/CONTRAST: CPT | Performed by: RADIOLOGY

## 2024-12-09 PROCEDURE — 99285 EMERGENCY DEPT VISIT HI MDM: CPT | Mod: 25 | Performed by: EMERGENCY MEDICINE

## 2024-12-09 PROCEDURE — 36415 COLL VENOUS BLD VENIPUNCTURE: CPT | Performed by: NURSE PRACTITIONER

## 2024-12-09 PROCEDURE — 85007 BL SMEAR W/DIFF WBC COUNT: CPT | Performed by: NURSE PRACTITIONER

## 2024-12-09 PROCEDURE — 81001 URINALYSIS AUTO W/SCOPE: CPT | Performed by: NURSE PRACTITIONER

## 2024-12-09 PROCEDURE — 2500000001 HC RX 250 WO HCPCS SELF ADMINISTERED DRUGS (ALT 637 FOR MEDICARE OP): Performed by: EMERGENCY MEDICINE

## 2024-12-09 PROCEDURE — 87086 URINE CULTURE/COLONY COUNT: CPT | Mod: WESLAB | Performed by: NURSE PRACTITIONER

## 2024-12-09 PROCEDURE — 2550000001 HC RX 255 CONTRASTS: Performed by: EMERGENCY MEDICINE

## 2024-12-09 PROCEDURE — 80053 COMPREHEN METABOLIC PANEL: CPT | Performed by: NURSE PRACTITIONER

## 2024-12-09 RX ORDER — ACETAMINOPHEN 325 MG/1
975 TABLET ORAL ONCE
Status: COMPLETED | OUTPATIENT
Start: 2024-12-09 | End: 2024-12-09

## 2024-12-09 RX ORDER — NAPROXEN 500 MG/1
500 TABLET ORAL 2 TIMES DAILY PRN
Qty: 14 TABLET | Refills: 0 | Status: SHIPPED | OUTPATIENT
Start: 2024-12-09 | End: 2024-12-16

## 2024-12-09 RX ORDER — IBUPROFEN 400 MG/1
400 TABLET ORAL ONCE
Status: COMPLETED | OUTPATIENT
Start: 2024-12-09 | End: 2024-12-09

## 2024-12-09 ASSESSMENT — LIFESTYLE VARIABLES
HAVE YOU EVER FELT YOU SHOULD CUT DOWN ON YOUR DRINKING: NO
EVER HAD A DRINK FIRST THING IN THE MORNING TO STEADY YOUR NERVES TO GET RID OF A HANGOVER: NO
TOTAL SCORE: 0
HAVE PEOPLE ANNOYED YOU BY CRITICIZING YOUR DRINKING: NO
EVER FELT BAD OR GUILTY ABOUT YOUR DRINKING: NO

## 2024-12-09 ASSESSMENT — PAIN DESCRIPTION - PAIN TYPE: TYPE: ACUTE PAIN

## 2024-12-09 ASSESSMENT — PAIN DESCRIPTION - LOCATION: LOCATION: BACK

## 2024-12-09 ASSESSMENT — PAIN - FUNCTIONAL ASSESSMENT: PAIN_FUNCTIONAL_ASSESSMENT: 0-10

## 2024-12-09 ASSESSMENT — PAIN SCALES - GENERAL: PAINLEVEL_OUTOF10: 5 - MODERATE PAIN

## 2024-12-09 NOTE — PROGRESS NOTES
Attestation/Supervisory note for RALPH Lowry      The patient is a 44-year-old female presenting to the emergency department for evaluation of right-sided lower back pain, lower abdominal pain consistent with her fibroids and generalized malaise.  She states that she has had symptoms for the past week or more.  She states that the pain is a dull aching pain.  No better or worse.  No radiation.  It is constant.  She denies any headache or visual changes.  No focal weakness or numbness.  No fever or chills.  No chest pain or shortness of breath.  No midline neck or back pain.  No bowel or bladder incontinence.  No urinary retention.  No focal weakness or numbness.  No vaginal discharge.  No diarrhea or constipation.  No nausea or vomiting.  All pertinent positives and negatives are recorded above.  All other systems reviewed and otherwise negative.  Vital signs with mild diastolic hypertension but otherwise within normal limits.  Physical exam with a well-nourished well-developed female in no acute distress.  HEENT exam within normal limits.  She has no evidence of airway compromise or respiratory distress.  Abdominal exam mild diffuse tenderness to palpation in the lower abdomen.  No rebound or guarding.  No palpable masses..  She does not have any gross motor, neurologic or vascular deficits on exam.  Pulses are equal bilaterally.  No flank pain with percussion or palpation.  She is able to walk and stand without assistance.  She is able to converse without difficulty.      Oral ibuprofen and oral acetaminophen ordered      Diagnostic labs without significant abnormality      CT abdomen pelvis w IV contrast   Final Result   Partially contracted and thick-walled gallbladder could reflect   nonfasting state or chronic cholecystitis.        Stable 2.1 cm left adrenal mass. Continued follow-up to assess   stability or further evaluation with MRI as clinically warranted.        Fibroid uterus, septated likely physiologic  cyst in the left   adnexa/ovary. Small amount of free fluid in the pelvis, likely   physiologic in a patient of this age.        Additional findings as described above.        MACRO:   None.        Signed by: Candi Trevino 12/9/2024 12:29 PM   Dictation workstation:   [id number]           The patient does not have any evidence of hemodynamic instability as she is well-perfused on exam.  She does not have any gross motor, neurologic or vascular deficits on exam.  She does not have a surgical abdomen by exam.  Diagnostic labs without significant abnormality.  CT abdomen pelvis shows a partially contracted and thick-walled gallbladder that the radiologist feels is reflective of a nonfasting state and or chronic cholecystitis but there is no evidence of acute cholecystitis.  There is a stable 2.1 cm left adrenal mass.  There is a fibroid uterus and a physiologic cyst in the left adnexa but no evidence of other acute process.  No evidence of pancreatitis, diverticulitis, appendicitis or bowel obstruction.    The patient was released in good condition.  She was instructed to follow-up with her primary care physician within 1 to 2 days for further management of her current symptoms and repeat check of her blood pressure.  She will follow-up with OB/GYN for further management of her fibroid uterus.  She will follow-up with gastroenterology for further management of her chronic cholecystitis.  She will return to the emergency department sooner with worsening symptoms or onset of new symptoms      Impression/diagnosis:  1.  Diffuse lower abdominal pain  2.  Right-sided flank pain  3.  Diastolic hypertension      I personally saw the patient and made/approve the management plan and take responsibility for the patient management.      I independently interpreted the following study (S) diagnostic labs      I personally discussed the patient's management with the patient      I reviewed the results of the diagnostic labs and  diagnostic imaging.  Formal radiology read was completed by the radiologist.      Maria Guadalupe Adamson MD

## 2024-12-09 NOTE — ED PROVIDER NOTES
"HPI   Chief Complaint   Patient presents with    Back Pain       Radha is a 45 y/o female with a hx of uterine leiomyoma and GERD, who presents to the ED for left lower quadrant abdominal pain that radiates to the back x 7 days. She states the pain is achy and constant, with waves of sharpness. She states it is \"7/10\", and has been using recreational marijuana for relief. She endorses intermittent nausea, without vomiting or diarrhea, and increased urinary frequency. She states the pain aligned with her menstrual cycle, but is still present event jeannine her cycle has ended. She denies fever, chills, hematuria, flank pain, melena, constipation, hematochezia, change in appetite, chest pain, dizziness, shortness of breath, dysuria, or vaginal discharge. VS are positive for hypertension.     10 point ROS reviewed and negative except in HPI      History provided by:  Patient   used: No            Patient History   Past Medical History:   Diagnosis Date    Uterine leiomyoma      History reviewed. No pertinent surgical history.  Family History   Problem Relation Name Age of Onset    No Known Problems Sister      No Known Problems Sister      No Known Problems Sister      No Known Problems Brother      No Known Problems Brother      No Known Problems Brother       Social History     Tobacco Use    Smoking status: Not on file    Smokeless tobacco: Not on file   Substance Use Topics    Alcohol use: Not on file    Drug use: Yes     Types: Marijuana       Physical Exam   ED Triage Vitals [12/09/24 1000]   Temperature Heart Rate Respirations BP   36.6 °C (97.9 °F) 86 16 (!) 137/93      Pulse Ox Temp Source Heart Rate Source Patient Position   98 % Oral Radial Sitting      BP Location FiO2 (%)     Left arm --       Physical Exam  Vitals and nursing note reviewed.   Constitutional:       General: She is not in acute distress.     Appearance: Normal appearance. She is normal weight. She is not ill-appearing, " toxic-appearing or diaphoretic.   HENT:      Head: Normocephalic.      Mouth/Throat:      Mouth: Mucous membranes are moist.   Cardiovascular:      Rate and Rhythm: Normal rate and regular rhythm.      Pulses: Normal pulses.      Heart sounds: Normal heart sounds.   Pulmonary:      Effort: Pulmonary effort is normal.      Breath sounds: Normal breath sounds.   Abdominal:      General: Bowel sounds are normal. There is no distension.      Palpations: Abdomen is soft. There is no mass.      Tenderness: There is abdominal tenderness in the suprapubic area and left lower quadrant. There is no right CVA tenderness, left CVA tenderness, guarding or rebound.      Hernia: No hernia is present.   Skin:     General: Skin is warm and dry.      Capillary Refill: Capillary refill takes less than 2 seconds.   Neurological:      Mental Status: She is alert and oriented to person, place, and time.           ED Course & MDM   Diagnoses as of 12/09/24 1238   Acute low back pain, unspecified back pain laterality, unspecified whether sciatica present                 No data recorded     Lien Coma Scale Score: 15 (12/09/24 1002 : Mary Rodriges RN)                           Medical Decision Making  History obtained from: patient    Vital signs, nursing notes, current medications, past medical history, Surgical history, allergies, social history, family History were reviewed.         HPI:  Patient 44-year-old female presenting emergency room today for evaluation of right-sided lower back pain.  States she had some lower abdominal pain and pelvic pain as well.  States is consistent with pain she has had with the fibroids in the past.She had nausea but no vomiting no diarrhea.  No cough or congestion.  Denies bowel or bladder dysfunction.  Denies pain numbness tingling or weakness down her legs.      10 point ROS was reviewed and negative except Noted above in HPI.  DDX: as listed above          MDM Summary/considerations:  Labs Reviewed    CBC WITH AUTO DIFFERENTIAL - Abnormal       Result Value    WBC 9.2      nRBC 0.0      RBC 4.48      Hemoglobin 12.7      Hematocrit 39.0      MCV 87      MCH 28.3      MCHC 32.6      RDW 15.5 (*)     Platelets 356      Immature Granulocytes %, Automated 0.2      Immature Granulocytes Absolute, Automated 0.02      Narrative:     The previously reported component Neutrophils % is no longer being reported.  The previously reported component Lymphocytes % is no longer being reported.  The previously reported component Monocytes % is no longer being reported.  The previously   reported component Eosinophils % is no longer being reported.  The previously reported component Basophils % is no longer being reported.  The previously reported component Absolute Neutrophils is no longer being reported.  The previously reported   component Absolute Lymphocytes is no longer being reported.  The previously reported component Absolute Monocytes is no longer being reported.  The previously reported component Absolute Eosinophils is no longer being reported.  The previously reported   component Absolute Basophils is no longer being reported.   COMPREHENSIVE METABOLIC PANEL - Abnormal    Glucose 95      Sodium 138      Potassium 4.0      Chloride 107      Bicarbonate 23      Anion Gap 12      Urea Nitrogen 13      Creatinine 0.70      eGFR >90      Calcium 8.5 (*)     Albumin 4.2      Alkaline Phosphatase 74      Total Protein 7.1      AST 16      Bilirubin, Total 0.4      ALT 9     URINALYSIS WITH REFLEX CULTURE AND MICROSCOPIC - Abnormal    Color, Urine Colorless (*)     Appearance, Urine Clear      Specific Gravity, Urine 1.011      pH, Urine 5.5      Protein, Urine NEGATIVE      Glucose, Urine Normal      Blood, Urine NEGATIVE      Ketones, Urine NEGATIVE      Bilirubin, Urine NEGATIVE      Urobilinogen, Urine Normal      Nitrite, Urine NEGATIVE      Leukocyte Esterase, Urine 25 Jay/µL (*)    MICROSCOPIC ONLY, URINE - Abnormal     WBC, Urine NONE      RBC, Urine 1-2      Squamous Epithelial Cells, Urine 1-9 (SPARSE)      Bacteria, Urine 1+ (*)    MANUAL DIFFERENTIAL - Abnormal    Neutrophils %, Manual 43.0      Lymphocytes %, Manual 46.0      Monocytes %, Manual 6.0      Eosinophils %, Manual 3.0      Basophils %, Manual 2.0      Seg Neutrophils Absolute, Manual 3.96      Lymphocytes Absolute, Manual 4.23      Monocytes Absolute, Manual 0.55      Eosinophils Absolute, Manual 0.28      Basophils Absolute, Manual 0.18 (*)     Total Cells Counted 100      RBC Morphology See Below      Polychromasia Mild      Target Cells Few     LIPASE - Normal    Lipase 17      Narrative:     Venipuncture immediately after or during the administration of Metamizole may lead to falsely low results. Testing should be performed immediately prior to Metamizole dosing.   HCG, URINE, QUALITATIVE - Normal    HCG, Urine NEGATIVE     URINE CULTURE   URINALYSIS WITH REFLEX CULTURE AND MICROSCOPIC    Narrative:     The following orders were created for panel order Urinalysis with Reflex Culture and Microscopic.  Procedure                               Abnormality         Status                     ---------                               -----------         ------                     Urinalysis with Reflex C...[[id number]]  Abnormal            Final result               Extra Urine Gray Tube[[id number]]                            In process                   Please view results for these tests on the individual orders.   EXTRA URINE GRAY TUBE     CT abdomen pelvis w IV contrast   Final Result   Partially contracted and thick-walled gallbladder could reflect   nonfasting state or chronic cholecystitis.        Stable 2.1 cm left adrenal mass. Continued follow-up to assess   stability or further evaluation with MRI as clinically warranted.        Fibroid uterus, septated likely physiologic cyst in the left   adnexa/ovary. Small amount of free fluid in the pelvis, likely    physiologic in a patient of this age.        Additional findings as described above.        MACRO:   None.        Signed by: Candi Trevino 12/9/2024 12:29 PM   Dictation workstation:   [id number]        Medications   acetaminophen (Tylenol) tablet 975 mg (has no administration in time range)   ibuprofen tablet 400 mg (has no administration in time range)   iohexol (OMNIPaque) 350 mg iodine/mL solution 75 mL (75 mL intravenous Given 12/9/24 7107)     New Prescriptions    NAPROXEN (NAPROSYN) 500 MG TABLET    Take 1 tablet (500 mg) by mouth 2 times a day as needed for moderate pain (4 - 6) for up to 7 days.     I estimate there is a low risk for acute appendicitis, bowel extraction, cystitis, diverticulitis, incarcerated hernia, mesenteric ischemia, pancreatitis, or perforated bowel or ulcer, thus I considered the discharge disposition reasonable. There is no evidence of peritonitis, sepsis, or toxicity. I have discussed the diagnosis and risks, and we agreed with discharging home to follow-up with the primary care doctor. We also discussed returning to the emergency department immediately if new or worsening symptoms occur. Symptoms of most concern that we discussed are bloody stool, fever, changing or worsening pain, or vomiting that necessitates immediate return.    CT scan does show uterine fibroids, thick-walled gallbladder without evidence of acute cholecystitis.  Laboratory assessment shows no evidence of leukocytosis.  Metabolic panel within normal limits.  No evidence of UTI.  Negative pregnancy test done.  Patient be discharged home on anti-inflammatories, recommend following with her GYN for the surgery she feels to have 100 fibroids.    All of the patient's questions were answered to the best of my ability.  Patient states understanding that they have been screened for an emergency today and we have not found any etiology of symptoms that requires emergent treatment or admission to the hospital at this  point. They understand that they have not had definitive care day and require follow-up for treatment of their condition. They also state understanding that they may have an emergent condition that may potentially have not of detected at this visit and they must return to the emergency department if they develop any worsening of symptoms or new complaints.      I saw this patient in conjunction with Dr. Rogers, please see her supervision note.              Critical Care: Not warranted at this time        This chart was completed using voice recognition transcription software. Please excuse any errors of transcription including grammatical, punctuation, syntax and spelling errors.  Please contact me with any questions regarding this chart.      Procedure  Procedures     QUENTIN Tsai  12/09/24 1238       QUENTIN Tsai  12/09/24 1238       QUENTIN Tsai  12/09/24 1238

## 2024-12-10 LAB — BACTERIA UR CULT: NORMAL

## 2024-12-31 ENCOUNTER — HOSPITAL ENCOUNTER (EMERGENCY)
Facility: HOSPITAL | Age: 44
Discharge: HOME | End: 2024-12-31
Attending: STUDENT IN AN ORGANIZED HEALTH CARE EDUCATION/TRAINING PROGRAM
Payer: MEDICAID

## 2024-12-31 VITALS
HEIGHT: 61 IN | RESPIRATION RATE: 18 BRPM | HEART RATE: 99 BPM | BODY MASS INDEX: 18.88 KG/M2 | DIASTOLIC BLOOD PRESSURE: 85 MMHG | WEIGHT: 100 LBS | SYSTOLIC BLOOD PRESSURE: 136 MMHG | TEMPERATURE: 98.6 F | OXYGEN SATURATION: 99 %

## 2024-12-31 DIAGNOSIS — R11.2 NAUSEA AND VOMITING, UNSPECIFIED VOMITING TYPE: Primary | ICD-10-CM

## 2024-12-31 LAB
ALBUMIN SERPL BCP-MCNC: 4.5 G/DL (ref 3.4–5)
ALP SERPL-CCNC: 65 U/L (ref 33–110)
ALT SERPL W P-5'-P-CCNC: 12 U/L (ref 7–45)
ANION GAP SERPL CALCULATED.3IONS-SCNC: 18 MMOL/L (ref 10–20)
AST SERPL W P-5'-P-CCNC: 25 U/L (ref 9–39)
BASOPHILS # BLD AUTO: 0.05 X10*3/UL (ref 0–0.1)
BASOPHILS NFR BLD AUTO: 0.4 %
BILIRUB SERPL-MCNC: 0.6 MG/DL (ref 0–1.2)
BUN SERPL-MCNC: 12 MG/DL (ref 6–23)
CALCIUM SERPL-MCNC: 8.9 MG/DL (ref 8.6–10.3)
CHLORIDE SERPL-SCNC: 105 MMOL/L (ref 98–107)
CO2 SERPL-SCNC: 19 MMOL/L (ref 21–32)
CREAT SERPL-MCNC: 0.89 MG/DL (ref 0.5–1.05)
EGFRCR SERPLBLD CKD-EPI 2021: 83 ML/MIN/1.73M*2
EOSINOPHIL # BLD AUTO: 0.03 X10*3/UL (ref 0–0.7)
EOSINOPHIL NFR BLD AUTO: 0.2 %
ERYTHROCYTE [DISTWIDTH] IN BLOOD BY AUTOMATED COUNT: 15 % (ref 11.5–14.5)
GLUCOSE SERPL-MCNC: 131 MG/DL (ref 74–99)
HCT VFR BLD AUTO: 35.3 % (ref 36–46)
HGB BLD-MCNC: 12.2 G/DL (ref 12–16)
IMM GRANULOCYTES # BLD AUTO: 0.05 X10*3/UL (ref 0–0.7)
IMM GRANULOCYTES NFR BLD AUTO: 0.4 % (ref 0–0.9)
LIPASE SERPL-CCNC: 8 U/L (ref 9–82)
LYMPHOCYTES # BLD AUTO: 3.12 X10*3/UL (ref 1.2–4.8)
LYMPHOCYTES NFR BLD AUTO: 25.9 %
MCH RBC QN AUTO: 28.5 PG (ref 26–34)
MCHC RBC AUTO-ENTMCNC: 34.6 G/DL (ref 32–36)
MCV RBC AUTO: 83 FL (ref 80–100)
MONOCYTES # BLD AUTO: 1.06 X10*3/UL (ref 0.1–1)
MONOCYTES NFR BLD AUTO: 8.8 %
NEUTROPHILS # BLD AUTO: 7.75 X10*3/UL (ref 1.2–7.7)
NEUTROPHILS NFR BLD AUTO: 64.3 %
NRBC BLD-RTO: 0 /100 WBCS (ref 0–0)
PLATELET # BLD AUTO: 425 X10*3/UL (ref 150–450)
POTASSIUM SERPL-SCNC: 3.7 MMOL/L (ref 3.5–5.3)
PROT SERPL-MCNC: 7.1 G/DL (ref 6.4–8.2)
RBC # BLD AUTO: 4.28 X10*6/UL (ref 4–5.2)
SODIUM SERPL-SCNC: 138 MMOL/L (ref 136–145)
WBC # BLD AUTO: 12.1 X10*3/UL (ref 4.4–11.3)

## 2024-12-31 PROCEDURE — 2500000004 HC RX 250 GENERAL PHARMACY W/ HCPCS (ALT 636 FOR OP/ED): Performed by: NURSE PRACTITIONER

## 2024-12-31 PROCEDURE — 96374 THER/PROPH/DIAG INJ IV PUSH: CPT

## 2024-12-31 PROCEDURE — 36415 COLL VENOUS BLD VENIPUNCTURE: CPT | Performed by: NURSE PRACTITIONER

## 2024-12-31 PROCEDURE — 80053 COMPREHEN METABOLIC PANEL: CPT | Performed by: NURSE PRACTITIONER

## 2024-12-31 PROCEDURE — 99284 EMERGENCY DEPT VISIT MOD MDM: CPT | Mod: 25 | Performed by: STUDENT IN AN ORGANIZED HEALTH CARE EDUCATION/TRAINING PROGRAM

## 2024-12-31 PROCEDURE — 83690 ASSAY OF LIPASE: CPT | Performed by: NURSE PRACTITIONER

## 2024-12-31 PROCEDURE — 96372 THER/PROPH/DIAG INJ SC/IM: CPT | Performed by: NURSE PRACTITIONER

## 2024-12-31 PROCEDURE — 96375 TX/PRO/DX INJ NEW DRUG ADDON: CPT

## 2024-12-31 PROCEDURE — 85025 COMPLETE CBC W/AUTO DIFF WBC: CPT | Performed by: NURSE PRACTITIONER

## 2024-12-31 RX ORDER — FAMOTIDINE 20 MG/1
20 TABLET, FILM COATED ORAL DAILY
Qty: 30 TABLET | Refills: 0 | Status: SHIPPED | OUTPATIENT
Start: 2024-12-31 | End: 2025-01-30

## 2024-12-31 RX ORDER — ONDANSETRON HYDROCHLORIDE 2 MG/ML
4 INJECTION, SOLUTION INTRAVENOUS ONCE
Status: COMPLETED | OUTPATIENT
Start: 2024-12-31 | End: 2024-12-31

## 2024-12-31 RX ORDER — KETOROLAC TROMETHAMINE 30 MG/ML
15 INJECTION, SOLUTION INTRAMUSCULAR; INTRAVENOUS ONCE
Status: COMPLETED | OUTPATIENT
Start: 2024-12-31 | End: 2024-12-31

## 2024-12-31 RX ORDER — ONDANSETRON 4 MG/1
4 TABLET, ORALLY DISINTEGRATING ORAL EVERY 8 HOURS PRN
Qty: 30 TABLET | Refills: 0 | Status: SHIPPED | OUTPATIENT
Start: 2024-12-31

## 2024-12-31 RX ORDER — OLANZAPINE 10 MG/2ML
5 INJECTION, POWDER, FOR SOLUTION INTRAMUSCULAR ONCE
Status: COMPLETED | OUTPATIENT
Start: 2024-12-31 | End: 2024-12-31

## 2024-12-31 RX ORDER — FAMOTIDINE 10 MG/ML
20 INJECTION INTRAVENOUS ONCE
Status: COMPLETED | OUTPATIENT
Start: 2024-12-31 | End: 2024-12-31

## 2024-12-31 RX ADMIN — OLANZAPINE 5 MG: 10 INJECTION, POWDER, FOR SOLUTION INTRAMUSCULAR at 14:20

## 2024-12-31 RX ADMIN — FAMOTIDINE 20 MG: 10 INJECTION, SOLUTION INTRAVENOUS at 13:02

## 2024-12-31 RX ADMIN — ONDANSETRON HYDROCHLORIDE 4 MG: 2 INJECTION INTRAMUSCULAR; INTRAVENOUS at 13:02

## 2024-12-31 RX ADMIN — KETOROLAC TROMETHAMINE 15 MG: 30 INJECTION, SOLUTION INTRAMUSCULAR at 13:02

## 2024-12-31 ASSESSMENT — PAIN - FUNCTIONAL ASSESSMENT: PAIN_FUNCTIONAL_ASSESSMENT: 0-10

## 2024-12-31 ASSESSMENT — COLUMBIA-SUICIDE SEVERITY RATING SCALE - C-SSRS
6. HAVE YOU EVER DONE ANYTHING, STARTED TO DO ANYTHING, OR PREPARED TO DO ANYTHING TO END YOUR LIFE?: NO
2. HAVE YOU ACTUALLY HAD ANY THOUGHTS OF KILLING YOURSELF?: NO
1. IN THE PAST MONTH, HAVE YOU WISHED YOU WERE DEAD OR WISHED YOU COULD GO TO SLEEP AND NOT WAKE UP?: NO

## 2024-12-31 ASSESSMENT — PAIN DESCRIPTION - LOCATION: LOCATION: ABDOMEN

## 2024-12-31 ASSESSMENT — PAIN DESCRIPTION - DESCRIPTORS: DESCRIPTORS: CRAMPING

## 2024-12-31 ASSESSMENT — PAIN DESCRIPTION - PAIN TYPE: TYPE: ACUTE PAIN

## 2024-12-31 ASSESSMENT — PAIN SCALES - GENERAL: PAINLEVEL_OUTOF10: 10 - WORST POSSIBLE PAIN

## 2024-12-31 NOTE — ED PROVIDER NOTES
HPI   Chief Complaint   Patient presents with    Vomiting     Presents to ED for vomiting and diarrhea intermittently since last Tuesday.      Diarrhea       HPI    See my MDM    Patient History   Past Medical History:   Diagnosis Date    Uterine leiomyoma      No past surgical history on file.  Family History   Problem Relation Name Age of Onset    No Known Problems Sister      No Known Problems Sister      No Known Problems Sister      No Known Problems Brother      No Known Problems Brother      No Known Problems Brother       Social History     Tobacco Use    Smoking status: Not on file    Smokeless tobacco: Not on file   Substance Use Topics    Alcohol use: Not on file    Drug use: Yes     Types: Marijuana       Physical Exam   ED Triage Vitals [12/31/24 1227]   Temp Heart Rate Respirations BP   -- (!) 106 20 (!) 141/110      Pulse Ox Temp src Heart Rate Source Patient Position   97 % -- -- Sitting      BP Location FiO2 (%)     Right arm --       Physical Exam  CONSTITUTIONAL: Vital signs reviewed as charted, well-developed and in no distress  Eyes: Extraocular muscles are intact. Pupils equal round and reactive to light. Conjunctiva are pink.    ENT: Mucous membranes are moist. Tongue in the midline. Pharynx was without erythema or exudates, uvula midline  LUNGS: Breath sounds equal and clear to auscultation. Good air exchange, no wheezes rales or retractions, pulse oximetry is charted.  HEART: Regular rate and rhythm without murmur thrill or rub, strong tones, auscultation is normal.  ABDOMEN: Generalized tenderness throughout the abdomen no rebound or guarding noted.  Abdomen soft  Neuro: The patient is awake, alert and oriented ×3. Moving all 4 extremities and answering questions appropriately.   MUSCULOSKELETAL: The calves are nontender to palpation. Full gross active range of motion.   PSYCH: Awake alert oriented, normal mood and affect.  Skin:  Dry, normal color, warm to the touch, no rash present.         ED Course & MDM   Diagnoses as of 12/31/24 1541   Nausea and vomiting, unspecified vomiting type                 No data recorded     Lien Coma Scale Score: 15 (12/31/24 1528 : Marjan Aguilar RN)                           Medical Decision Making  History obtained from: patient    Vital signs, nursing notes, current medications, past medical history, Surgical history, allergies, social history, family History were reviewed.         HPI:    Patient 44-year-old female presenting emergency room today noting of nausea vomiting ongoing intermittently for a week.She denies dizziness, chest pain, shortness of breath, or lower extremity edema.  She does admit to smoking marijuana daily.  States she has had this happen previously.  She denies urinary complaints.    10 point ROS was reviewed and negative except Noted above in HPI.  DDX: as listed above          MDM Summary/considerations:  Labs Reviewed   COMPREHENSIVE METABOLIC PANEL - Abnormal       Result Value    Glucose 131 (*)     Sodium 138      Potassium 3.7      Chloride 105      Bicarbonate 19 (*)     Anion Gap 18      Urea Nitrogen 12      Creatinine 0.89      eGFR 83      Calcium 8.9      Albumin 4.5      Alkaline Phosphatase 65      Total Protein 7.1      AST 25      Bilirubin, Total 0.6      ALT 12     LIPASE - Abnormal    Lipase 8 (*)     Narrative:     Venipuncture immediately after or during the administration of Metamizole may lead to falsely low results. Testing should be performed immediately prior to Metamizole dosing.   CBC WITH AUTO DIFFERENTIAL - Abnormal    WBC 12.1 (*)     nRBC 0.0      RBC 4.28      Hemoglobin 12.2      Hematocrit 35.3 (*)     MCV 83      MCH 28.5      MCHC 34.6      RDW 15.0 (*)     Platelets 425      Neutrophils % 64.3      Immature Granulocytes %, Automated 0.4      Lymphocytes % 25.9      Monocytes % 8.8      Eosinophils % 0.2      Basophils % 0.4      Neutrophils Absolute 7.75 (*)     Immature Granulocytes Absolute,  Automated 0.05      Lymphocytes Absolute 3.12      Monocytes Absolute 1.06 (*)     Eosinophils Absolute 0.03      Basophils Absolute 0.05     HCG, URINE, QUALITATIVE   URINALYSIS WITH REFLEX CULTURE AND MICROSCOPIC    Narrative:     The following orders were created for panel order Urinalysis with Reflex Culture and Microscopic.  Procedure                               Abnormality         Status                     ---------                               -----------         ------                     Urinalysis with Reflex C...[[id number]]                                                 Extra Urine Gray Tube[[id number]]                                                         Please view results for these tests on the individual orders.   URINALYSIS WITH REFLEX CULTURE AND MICROSCOPIC   EXTRA URINE GRAY TUBE     No orders to display     Medications   ketorolac (Toradol) injection 15 mg (15 mg intravenous Given 12/31/24 1302)   ondansetron (Zofran) injection 4 mg (4 mg intravenous Given 12/31/24 1302)   famotidine PF (Pepcid) injection 20 mg (20 mg intravenous Given 12/31/24 1302)   OLANZapine (ZyPREXA) injection 5 mg (5 mg intramuscular Given 12/31/24 1420)     New Prescriptions    FAMOTIDINE (PEPCID) 20 MG TABLET    Take 1 tablet (20 mg) by mouth once daily.    ONDANSETRON ODT (ZOFRAN-ODT) 4 MG DISINTEGRATING TABLET    Dissolve 1 tablet (4 mg) in the mouth every 8 hours if needed for nausea or vomiting.     I estimate there is LOW risk for EPIGLOTTITIS, PNEUMONIA, MENINGITIS, OR URINARY TRACT INFECTION, thus I consider the discharge disposition reasonable. Also, there is no evidence for peritonitis, sepsis, or toxicity. We have discussed the diagnosis and risks, and we agree with discharging home to follow-up with their primary doctor. We also discussed returning to the Emergency Department immediately if new or worsening symptoms occur. We have discussed the symptoms which are most concerning (e.g., changing or  worsening pain, trouble swallowing or breathing, neck stiffness, fever) that necessitate immediate return.    Patient feeling much better at this time, no active vomiting.  Discharged home stable condition.  Was given GI referral.  Does have leukocytosis 12.1 on workup but otherwise workup is normal.    All of the patient's questions were answered to the best of my ability.  Patient states understanding that they have been screened for an emergency today and we have not found any etiology of symptoms that requires emergent treatment or admission to the hospital at this point. They understand that they have not had definitive care day and require follow-up for treatment of their condition. They also state understanding that they may have an emergent condition that may potentially have not of detected at this visit and they must return to the emergency department if they develop any worsening of symptoms or new complaints.      I saw this patient in conjunction with Dr. Almeida, please see his supervision note.            Critical Care: Not warranted at this time        This chart was completed using voice recognition transcription software. Please excuse any errors of transcription including grammatical, punctuation, syntax and spelling errors.  Please contact me with any questions regarding this chart.    Procedure  Procedures     Guero Lowry, GANESH-CNP  12/31/24 4013